# Patient Record
Sex: MALE | Race: WHITE
[De-identification: names, ages, dates, MRNs, and addresses within clinical notes are randomized per-mention and may not be internally consistent; named-entity substitution may affect disease eponyms.]

---

## 2017-01-27 ENCOUNTER — HOSPITAL ENCOUNTER (EMERGENCY)
Dept: HOSPITAL 62 - ER | Age: 22
Discharge: HOME | End: 2017-01-27
Payer: SELF-PAY

## 2017-01-27 VITALS — SYSTOLIC BLOOD PRESSURE: 107 MMHG | DIASTOLIC BLOOD PRESSURE: 76 MMHG

## 2017-01-27 DIAGNOSIS — Z87.442: ICD-10-CM

## 2017-01-27 DIAGNOSIS — R10.9: ICD-10-CM

## 2017-01-27 DIAGNOSIS — F17.200: ICD-10-CM
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
## 2017-01-27 DIAGNOSIS — S39.011A: Primary | ICD-10-CM

## 2017-01-27 DIAGNOSIS — R11.0: ICD-10-CM

## 2017-01-27 LAB
ALBUMIN SERPL-MCNC: 4.3 G/DL (ref 3.5–5)
ALP SERPL-CCNC: 68 U/L (ref 38–126)
ALT SERPL-CCNC: 223 U/L (ref 21–72)
ANION GAP SERPL CALC-SCNC: 12 MMOL/L (ref 5–19)
APPEARANCE UR: (no result)
AST SERPL-CCNC: 317 U/L (ref 17–59)
BASOPHILS # BLD AUTO: 0 10^3/UL (ref 0–0.2)
BASOPHILS NFR BLD AUTO: 0.4 % (ref 0–2)
BILIRUB DIRECT SERPL-MCNC: 0 MG/DL (ref 0–0.3)
BILIRUB SERPL-MCNC: 0.5 MG/DL (ref 0.2–1.3)
BILIRUB UR QL STRIP: NEGATIVE
BUN SERPL-MCNC: 11 MG/DL (ref 7–20)
CALCIUM: 9.5 MG/DL (ref 8.4–10.2)
CHLORIDE SERPL-SCNC: 100 MMOL/L (ref 98–107)
CO2 SERPL-SCNC: 33 MMOL/L (ref 22–30)
CREAT SERPL-MCNC: 1.13 MG/DL (ref 0.52–1.25)
EOSINOPHIL # BLD AUTO: 0.3 10^3/UL (ref 0–0.6)
EOSINOPHIL NFR BLD AUTO: 4.4 % (ref 0–6)
ERYTHROCYTE [DISTWIDTH] IN BLOOD BY AUTOMATED COUNT: 13.5 % (ref 11.5–14)
GLUCOSE SERPL-MCNC: 112 MG/DL (ref 75–110)
GLUCOSE UR STRIP-MCNC: NEGATIVE MG/DL
HCT VFR BLD CALC: 46.8 % (ref 37.9–51)
HGB BLD-MCNC: 15.5 G/DL (ref 13.5–17)
HGB HCT DIFFERENCE: -0.3
KETONES UR STRIP-MCNC: NEGATIVE MG/DL
LIPASE SERPL-CCNC: 171.9 U/L (ref 23–300)
LYMPHOCYTES # BLD AUTO: 1.6 10^3/UL (ref 0.5–4.7)
LYMPHOCYTES NFR BLD AUTO: 21.9 % (ref 13–45)
MCH RBC QN AUTO: 31.2 PG (ref 27–33.4)
MCHC RBC AUTO-ENTMCNC: 33.1 G/DL (ref 32–36)
MCV RBC AUTO: 94 FL (ref 80–97)
MONOCYTES # BLD AUTO: 0.2 10^3/UL (ref 0.1–1.4)
MONOCYTES NFR BLD AUTO: 3 % (ref 3–13)
NEUTROPHILS # BLD AUTO: 5.3 10^3/UL (ref 1.7–8.2)
NEUTS SEG NFR BLD AUTO: 70.3 % (ref 42–78)
NITRITE UR QL STRIP: NEGATIVE
PH UR STRIP: 8 [PH] (ref 5–9)
POTASSIUM SERPL-SCNC: 4.4 MMOL/L (ref 3.6–5)
PROT SERPL-MCNC: 7 G/DL (ref 6.3–8.2)
PROT UR STRIP-MCNC: NEGATIVE MG/DL
RBC # BLD AUTO: 4.96 10^6/UL (ref 4.35–5.55)
SODIUM SERPL-SCNC: 144.9 MMOL/L (ref 137–145)
SP GR UR STRIP: 1.02
UROBILINOGEN UR-MCNC: NEGATIVE MG/DL (ref ?–2)
WBC # BLD AUTO: 7.5 10^3/UL (ref 4–10.5)

## 2017-01-27 PROCEDURE — 36415 COLL VENOUS BLD VENIPUNCTURE: CPT

## 2017-01-27 PROCEDURE — 74177 CT ABD & PELVIS W/CONTRAST: CPT

## 2017-01-27 PROCEDURE — 99284 EMERGENCY DEPT VISIT MOD MDM: CPT

## 2017-01-27 PROCEDURE — 96374 THER/PROPH/DIAG INJ IV PUSH: CPT

## 2017-01-27 PROCEDURE — 81001 URINALYSIS AUTO W/SCOPE: CPT

## 2017-01-27 PROCEDURE — 85025 COMPLETE CBC W/AUTO DIFF WBC: CPT

## 2017-01-27 PROCEDURE — 83690 ASSAY OF LIPASE: CPT

## 2017-01-27 PROCEDURE — 96375 TX/PRO/DX INJ NEW DRUG ADDON: CPT

## 2017-01-27 PROCEDURE — 80053 COMPREHEN METABOLIC PANEL: CPT

## 2017-01-27 NOTE — ER DOCUMENT REPORT
ED Medical Screen (RME)





- General


Stated Complaint: ABDOMINAL SWELLING


Time seen by provider: 14:55


Mode of Arrival: Ambulatory


Information source: Patient


Notes: 


21-year-old male resents to ED for a painful bulge in his right lower abdomen.  

States he has nausea no vomiting.  Denies any fevers.  Consult to Dr. Pang 

for the assessment he recommended labs and ultrasound.




















I have greeted and performed a rapid initial assessment of this patient.  A 

comprehensive ED assessment and evaluation of the patient, analysis of test 

results and completion of medical decision making process will be conducted by 

an additional ED providers.


TRAVEL OUTSIDE OF THE [place] IN LAST 30 DAYS: No





- Related Data


Allergies/Adverse Reactions: 


 





No Known Allergies Allergy (Verified 01/27/17 14:52)


 











Past Medical History





- Past Medical History


Cardiac Medical History: 


   Denies: Hx Atrial Fibrillation, Hx Congestive Heart Failure, Hx Coronary 

Artery Disease


Pulmonary Medical History: 


   Denies: Hx Asthma, Hx Bronchitis, Hx COPD, Hx Pneumonia


Neurological Medical History: Denies: Hx Cerebrovascular Accident, Hx Migraine, 

Hx Seizures


Endocrine Medical History: Denies: Hx Diabetes Mellitus Type 1, Hx Diabetes 

Mellitus Type 2, Hx Graves' Disease, Hx Hyperthyroidism


Renal/ Medical History: Reports: Hx Kidney Stones


Malignancy Medical History: Denies Hx Bone Cancer, Denies Hx Brain Cancer, 

Denies Hx Colorectal Cancer, Denies Hx Leukemia


GI Medical History: Denies: Hx Cirrhosis, Hx Crohn's Disease, Hx Diverticulitis

, Hx Gastritis


Musculoskeltal Medical History: Denies Hx Arthritis, Denies Hx Fibromyalgia, 

Denies Hx Gout, Denies Hx Multiple Sclerosis, Reports Hx Musculoskeletal Trauma


Skin Medical History: Denies Hx Cellulitis, Denies Hx Eczema, Denies Hx MRSA


Psychiatric Medical History: 


   Denies: Hx Anxiety, Hx Attention Deficit Hyperactivity Disorder, Hx Bipolar 

Disorder


Traumatic Medical History: Reports: Hx Fractures - Multiple, Hx Pneumothorax


Past Surgical History: Reports: Hx Orthopedic Surgery - facial reconstruction





- Immunizations


Immunizations up to date: Yes


Hx Diphtheria, Pertussis, Tetanus Vaccination: Yes

## 2017-01-27 NOTE — ER DOCUMENT REPORT
ED GI/





- General


Time seen by provider: 16:20


Mode of Arrival: Ambulatory


Information source: Patient


TRAVEL OUTSIDE OF THE [place] IN LAST 30 DAYS: No





- HPI


Patient complains to provider of: Abdominal pain


Onset: This morning


Timing/Duration: Gradual, Persistent


Associated symptoms: Nausea





<FAISAL CAMILO - Last Filed: 01/27/17 17:28>





<REZAAVTAR LOERA MAMTA - Last Filed: 01/27/17 21:51>





- General


Chief Complaint: Abdominal Pain


Stated Complaint: ABDOMINAL SWELLING


Notes: 


Patient is a 21 year old male presenting to the emergency department with 

complaints of abdominal pain.  Patient states that his pain started this 

morning and that while sitting at his computer work he felt some sharp pains to 

his right side.  Patient states that his pain is exacerbated with sitting up or 

bending over.  Patient states that his pain is the fourth and the surface of 

his abdomen.  Patient also complains of some nausea.  Patient's mother states 

that she found out about the abdominal pain around 14:15 this afternoon.  

Patient's abdominal pain is on the right side. Patient describes his right side 

of his abdomen to move like "jelly" when rubbing your hand across it and states 

that the left side does not move "like jelly." Patient does not have any pain 

on the left side of his abdomen. Patient has no previous abdominal surgeries. 

Patient has no known allergies. (FAISAL CAMILO)





- Related Data


Allergies/Adverse Reactions: 


 





No Known Allergies Allergy (Verified 01/27/17 14:52)


 











Past Medical History





- General


Information source: Patient





- Social History


Smoking Status: Current Every Day Smoker


Chew tobacco use (# tins/day): No


Frequency of alcohol use: Occasional


Drug Abuse: None


Family History: None


Patient has suicidal ideation: No


Patient has homicidal ideation: No


Renal/ Medical History: Reports: Hx Kidney Stones


Musculoskeltal Medical History: Reports Hx Musculoskeletal Trauma


Traumatic Medical History: Reports: Hx Fractures - Multiple, Hx Pneumothorax


Past Surgical History: Reports: Hx Orthopedic Surgery - facial reconstruction





- Immunizations


Immunizations up to date: Yes


Hx Diphtheria, Pertussis, Tetanus Vaccination: Yes





<FAISAL CAMILO - Last Filed: 01/27/17 17:28>





Review of Systems





- Review of Systems


Constitutional: No symptoms reported


EENT: No symptoms reported


Cardiovascular: No symptoms reported


Respiratory: No symptoms reported


Gastrointestinal: See HPI, Abdominal pain, Nausea


Genitourinary: No symptoms reported


Male Genitourinary: No symptoms reported


Musculoskeletal: No symptoms reported


Skin: No symptoms reported


Hematologic/Lymphatic: No symptoms reported


Neurological/Psychological: No symptoms reported


-: Yes All other systems reviewed and negative





<FAISAL CAMILO - Last Filed: 01/27/17 17:28>





Physical Exam





- Vital signs


Interpretation: Normal





- General


General appearance: Appears well, Alert


In distress: Mild





- HEENT


Head: Normocephalic, Atraumatic


Eyes: Normal


Pupils: PERRL


Mucous membranes: Normal





- Respiratory


Respiratory status: No respiratory distress


Chest status: Nontender


Breath sounds: Normal


Chest palpation: Normal





- Cardiovascular


Rhythm: Regular


Heart sounds: Normal auscultation


Murmur: No





- Abdominal


Inspection: Normal


Distension: No distension


Bowel sounds: Normal


Tenderness: Tender - Right abdomen is tender to palpation


Organomegaly: No organomegaly





- Back


Back: Normal, Nontender





- Extremities


General upper extremity: Normal inspection, Normal ROM, Normal strength


General lower extremity: Normal inspection, Normal ROM, Normal strength





- Neurological


Neuro grossly intact: Yes


Cognition: Normal


Orientation: AAOx4


[place] Coma Scale Eye Opening: Spontaneous


[place] Coma Scale Verbal: Oriented


[place] Coma Scale Motor: Obeys Commands


[place] Coma Scale Total: 15


Speech: Normal





- Psychological


Associated symptoms: Normal affect, Normal mood





- Skin


Skin Temperature: Warm


Skin Moisture: Dry





<FAISAL CAMILO - Last Filed: 01/27/17 17:28>





Course





- Laboratory


Result Diagrams: 


 01/27/17 15:00





 01/27/17 15:00





<LESLEEFAISAL - Last Filed: 01/27/17 17:28>





- Laboratory


Result Diagrams: 


 01/27/17 15:00





 01/27/17 15:00





<AVTAR TOUSSAINT - Last Filed: 01/27/17 21:51>





- Re-evaluation


Re-evalutation: 





01/27/17 


Patient with tenderness to palpation over his right lower abdomen.  No evidence 

for hernia.  No genital pain.  CT with no acute findings.  Will likely rectus 

muscle injury.  Patient is eating in the room and feels better.  He would like 

to go home.  Stable for discharge.  Return if any worsening or concerning 

symptoms. (AVTAR TOUSSAINT)





- Vital Signs


Vital signs: 


 











Temp Pulse Resp BP Pulse Ox


 


 98.3 F   76   16   107/76   98 


 


 01/27/17 19:10  01/27/17 19:10  01/27/17 19:10  01/27/17 19:10  01/27/17 19:10








 (FAISAL CAMILO)


 (AVTAR TOUSSAINT)





- Laboratory


Laboratory results interpreted by me: 


 











  01/27/17





  15:00


 


Carbon Dioxide  33 H


 


Glucose  112 H


 


AST  317 H


 


ALT  223 H








 (FAISAL CAMILO)


 (AVTAR TOUSSAINT)





Discharge





<FAISAL CAMILO - Last Filed: 01/27/17 17:28>





<AVTAR TOUSSAINT - Last Filed: 01/27/17 21:51>





- Discharge


Clinical Impression: 


Strain of rectus abdominis muscle


Qualifiers:


 Encounter type: initial encounter Qualified Code(s): S39.011A - Strain of 

muscle, fascia and tendon of abdomen, initial encounter





Condition: Stable


Disposition: HOME, SELF-CARE


Instructions:  Muscle Strain ([facility])


Forms:  Return to Work


Scribe Attestation: 





01/27/17 21:51


I personally performed the services described in the documentation, reviewed 

and edited the documentation which was dictated to the scribe in my presence, 

and it accurately records my words and actions. (AVTAR TOUSSAINT)





Scribe Documentation





- Scribe


Written by Mic:Holli Camilo 1/27/17 17:30


acting as scribe for :: Sydney





<FAISAL CAMILO - Last Filed: 01/27/17 17:28>

## 2017-03-23 ENCOUNTER — HOSPITAL ENCOUNTER (EMERGENCY)
Dept: HOSPITAL 62 - ER | Age: 22
Discharge: HOME | End: 2017-03-23
Payer: SELF-PAY

## 2017-03-23 VITALS — DIASTOLIC BLOOD PRESSURE: 77 MMHG | SYSTOLIC BLOOD PRESSURE: 121 MMHG

## 2017-03-23 DIAGNOSIS — R41.0: ICD-10-CM

## 2017-03-23 DIAGNOSIS — M25.512: ICD-10-CM
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
## 2017-03-23 DIAGNOSIS — R51: ICD-10-CM

## 2017-03-23 DIAGNOSIS — F41.9: ICD-10-CM

## 2017-03-23 DIAGNOSIS — S02.2XXA: Primary | ICD-10-CM

## 2017-03-23 LAB
ANION GAP SERPL CALC-SCNC: 20 MMOL/L (ref 5–19)
BUN SERPL-MCNC: 12 MG/DL (ref 7–20)
CALCIUM: 9.6 MG/DL (ref 8.4–10.2)
CHLORIDE SERPL-SCNC: 103 MMOL/L (ref 98–107)
CO2 SERPL-SCNC: 24 MMOL/L (ref 22–30)
CREAT SERPL-MCNC: 1.21 MG/DL (ref 0.52–1.25)
ETHANOL SERPL-MCNC: < 10 MG/DL
GLUCOSE SERPL-MCNC: 105 MG/DL (ref 75–110)
POTASSIUM SERPL-SCNC: 4 MMOL/L (ref 3.6–5)
SODIUM SERPL-SCNC: 146.6 MMOL/L (ref 137–145)

## 2017-03-23 PROCEDURE — 80048 BASIC METABOLIC PNL TOTAL CA: CPT

## 2017-03-23 PROCEDURE — 73030 X-RAY EXAM OF SHOULDER: CPT

## 2017-03-23 PROCEDURE — 70450 CT HEAD/BRAIN W/O DYE: CPT

## 2017-03-23 PROCEDURE — 72125 CT NECK SPINE W/O DYE: CPT

## 2017-03-23 PROCEDURE — 70486 CT MAXILLOFACIAL W/O DYE: CPT

## 2017-03-23 PROCEDURE — 80307 DRUG TEST PRSMV CHEM ANLYZR: CPT

## 2017-03-23 PROCEDURE — L0120 CERV FLEX N/ADJ FOAM PRE OTS: HCPCS

## 2017-03-23 PROCEDURE — 99285 EMERGENCY DEPT VISIT HI MDM: CPT

## 2017-03-23 PROCEDURE — 36415 COLL VENOUS BLD VENIPUNCTURE: CPT
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
## 2017-06-19 ENCOUNTER — HOSPITAL ENCOUNTER (EMERGENCY)
Dept: HOSPITAL 62 - ER | Age: 22
Discharge: HOME | End: 2017-06-19
Payer: SELF-PAY

## 2017-06-19 VITALS — DIASTOLIC BLOOD PRESSURE: 68 MMHG | SYSTOLIC BLOOD PRESSURE: 106 MMHG

## 2017-06-19 DIAGNOSIS — R68.84: Primary | ICD-10-CM

## 2017-06-19 DIAGNOSIS — F17.210: ICD-10-CM

## 2017-06-19 DIAGNOSIS — M25.512: ICD-10-CM

## 2017-06-19 PROCEDURE — 99283 EMERGENCY DEPT VISIT LOW MDM: CPT
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
## 2017-06-19 NOTE — RADIOLOGY REPORT ([facility])
EXAM DESCRIPTION:  SHOULDER LEFT 2 OR MORE VIEWS



COMPLETED DATE/TIME:  6/19/2017 12:18 pm



REASON FOR STUDY:  left shoulder pain, hx of dislocation



COMPARISON:  3/23/2017.



NUMBER OF VIEWS:  Three views.



TECHNIQUE:  Internal rotation, external rotation, and Y view images acquired of the left shoulder.



LIMITATIONS:  None.



FINDINGS:  MINERALIZATION: Normal.

BONES: No acute fracture or dislocation.  No worrisome bone lesions.

JOINTS: No dislocation.

VISUALIZED LUNGS AND RIBS: No pneumothorax.  No rib fracture.

SOFT TISSUES: No radiopaque foreign body.

OTHER: No other significant finding.



IMPRESSION:  NEGATIVE STUDY OF THE LEFT SHOULDER. NO RADIOGRAPHIC EVIDENCE OF ACUTE INJURY.



TECHNICAL DOCUMENTATION:  JOB ID:  [id number]

 2011 [employer]- All Rights Reserved

## 2018-01-06 ENCOUNTER — HOSPITAL ENCOUNTER (EMERGENCY)
Dept: HOSPITAL 62 - ER | Age: 23
Discharge: HOME | End: 2018-01-06
Payer: SELF-PAY

## 2018-01-06 VITALS — DIASTOLIC BLOOD PRESSURE: 79 MMHG | SYSTOLIC BLOOD PRESSURE: 116 MMHG

## 2018-01-06 DIAGNOSIS — K62.89: Primary | ICD-10-CM

## 2018-01-06 DIAGNOSIS — K59.00: ICD-10-CM

## 2018-01-06 DIAGNOSIS — F17.200: ICD-10-CM

## 2018-01-06 PROCEDURE — 99283 EMERGENCY DEPT VISIT LOW MDM: CPT

## 2018-01-06 NOTE — ER DOCUMENT REPORT
ED Medical Screen (RME)





- General


Chief Complaint: Rectal Pain


Stated Complaint: RECTAL PAIN


Time Seen by Provider: 01/06/18 18:42


Notes: 





22-year-old male patient four-day history of rectal pain.  States he had an 

external hemorrhoid that he could feel about 14 days ago, it seems to have gone 

away but now a little bit up inside it is quite tender.  He has had an external 

hemorrhoid incised and drained in the past.





I have greeted and performed a rapid initial assessment of this patient.  A 

comprehensive ED assessment and evaluation of the patient, analysis of test 

results and completion of the medical decision making process will be conducted 

by additional ED providers.


TRAVEL OUTSIDE OF THE [place] IN LAST 30 DAYS: No





- Related Data


Allergies/Adverse Reactions: 


 





No Known Allergies Allergy (Verified 01/06/18 18:04)


 











Past Medical History





- Social History


Chew tobacco use (# tins/day): No


Frequency of alcohol use: Occasional


Drug Abuse: None





- Past Medical History


Cardiac Medical History: 


   Denies: Hx Atrial Fibrillation, Hx Congestive Heart Failure, Hx Coronary 

Artery Disease


Pulmonary Medical History: 


   Denies: Hx Asthma, Hx Bronchitis, Hx COPD, Hx Pneumonia


Neurological Medical History: Denies: Hx Cerebrovascular Accident, Hx Migraine, 

Hx Seizures


Endocrine Medical History: Denies: Hx Diabetes Mellitus Type 1, Hx Diabetes 

Mellitus Type 2, Hx Graves' Disease, Hx Hyperthyroidism


Renal/ Medical History: Reports: Hx Kidney Stones.  Denies: Hx Peritoneal 

Dialysis


Malignancy Medical History: Denies Hx Bone Cancer, Denies Hx Brain Cancer, 

Denies Hx Colorectal Cancer, Denies Hx Leukemia


GI Medical History: Denies: Hx Cirrhosis, Hx Crohn's Disease, Hx Diverticulitis

, Hx Gastritis


Musculoskeltal Medical History: Denies Hx Arthritis, Denies Hx Fibromyalgia, 

Denies Hx Gout, Denies Hx Multiple Sclerosis, Reports Hx Musculoskeletal Trauma


Skin Medical History: Denies Hx Cellulitis, Denies Hx Eczema, Denies Hx MRSA


Psychiatric Medical History: 


   Denies: Hx Anxiety, Hx Attention Deficit Hyperactivity Disorder, Hx Bipolar 

Disorder


Traumatic Medical History: Reports: Hx Fractures - Multiple, Hx Pneumothorax


Past Surgical History: Reports: Hx Orthopedic Surgery - facial reconstruction





- Immunizations


Immunizations up to date: Yes


Hx Diphtheria, Pertussis, Tetanus Vaccination: Yes





Physical Exam





- Vital signs


Vitals: 





 











Temp Pulse Resp BP Pulse Ox


 


 98.2 F   86   14   109/73   97 


 


 01/06/18 18:11  01/06/18 18:11  01/06/18 18:11  01/06/18 18:11  01/06/18 18:11














Course





- Vital Signs


Vital signs: 





 











Temp Pulse Resp BP Pulse Ox


 


 98.2 F   86   14   109/73   97 


 


 01/06/18 18:11  01/06/18 18:11  01/06/18 18:11  01/06/18 18:11  01/06/18 18:11

## 2018-01-06 NOTE — ER DOCUMENT REPORT
ED GI/





- General


Chief Complaint: Rectal Pain


Stated Complaint: RECTAL PAIN


Time Seen by Provider: 01/06/18 18:42


Notes: 


Patient is a 22-year-old male that comes emergency department for chief 

complaint of rectal pain.  He states it is been bothering him intermittently 

for the past month but more so over the past 4 days.  He reports pain with 

bowel movement.  He admits that he is frequently constipated, has not had a 

bowel movement in a couple of days, sometimes has gone weeks without having a 

bowel movement.  He has had hemorrhoid incision about 6 months ago.  He denies 

fever or chills, abdominal pain, nausea or vomiting.  He denies any daily 

medications.





TRAVEL OUTSIDE OF THE [place] IN LAST 30 DAYS: No





- Related Data


Allergies/Adverse Reactions: 


 





No Known Allergies Allergy (Verified 01/06/18 18:04)


 











Past Medical History





- General


Information source: Patient





- Social History


Smoking Status: Current Every Day Smoker


Chew tobacco use (# tins/day): No


Smoking Education Provided: Yes - <3 min


Frequency of alcohol use: Occasional


Drug Abuse: None


Lives with: Family


Family History: None


Patient has suicidal ideation: No


Patient has homicidal ideation: No





- Past Medical History


Cardiac Medical History: 


   Denies: Hx Atrial Fibrillation, Hx Congestive Heart Failure, Hx Coronary 

Artery Disease


Pulmonary Medical History: 


   Denies: Hx Asthma, Hx Bronchitis, Hx COPD, Hx Pneumonia


Neurological Medical History: Denies: Hx Cerebrovascular Accident, Hx Migraine, 

Hx Seizures


Endocrine Medical History: Denies: Hx Diabetes Mellitus Type 1, Hx Diabetes 

Mellitus Type 2, Hx Graves' Disease, Hx Hyperthyroidism


Renal/ Medical History: Reports: Hx Kidney Stones.  Denies: Hx Peritoneal 

Dialysis


Malignancy Medical History: Denies Hx Bone Cancer, Denies Hx Brain Cancer, 

Denies Hx Colorectal Cancer, Denies Hx Leukemia


GI Medical History: Denies: Hx Cirrhosis, Hx Crohn's Disease, Hx Diverticulitis

, Hx Gastritis


Musculoskeltal Medical History: Denies Hx Arthritis, Denies Hx Fibromyalgia, 

Denies Hx Gout, Denies Hx Multiple Sclerosis, Reports Hx Musculoskeletal Trauma


Skin Medical History: Denies Hx Cellulitis, Denies Hx Eczema, Denies Hx MRSA


Psychiatric Medical History: 


   Denies: Hx Anxiety, Hx Attention Deficit Hyperactivity Disorder, Hx Bipolar 

Disorder


Traumatic Medical History: Reports: Hx Fractures - Multiple, Hx Pneumothorax


Past Surgical History: Reports: Hx Orthopedic Surgery - facial reconstruction





- Immunizations


Immunizations up to date: Yes


Hx Diphtheria, Pertussis, Tetanus Vaccination: Yes





Review of Systems





- Review of Systems


Constitutional: No symptoms reported


EENT: No symptoms reported


Cardiovascular: No symptoms reported


Respiratory: No symptoms reported


Gastrointestinal: See HPI


Genitourinary: No symptoms reported


Male Genitourinary: No symptoms reported


Musculoskeletal: No symptoms reported


Skin: No symptoms reported


Hematologic/Lymphatic: No symptoms reported


Neurological/Psychological: No symptoms reported





Physical Exam





- Vital signs


Vitals: 


 











Temp Pulse Resp BP Pulse Ox


 


 98.2 F   86   14   109/73   97 


 


 01/06/18 18:11  01/06/18 18:11  01/06/18 18:11  01/06/18 18:11  01/06/18 18:11











Interpretation: Normal





- General


General appearance: Appears well, Alert





- HEENT


Head: Normocephalic, Atraumatic


Eyes: Normal


Pupils: PERRL





- Respiratory


Respiratory status: No respiratory distress


Chest status: Nontender


Breath sounds: Normal


Chest palpation: Normal





- Cardiovascular


Rhythm: Regular


Heart sounds: Normal auscultation


Murmur: No





- Abdominal


Inspection: Normal


Distension: No distension


Bowel sounds: Normal


Tenderness: Nontender.  No: Tender, Guarding


Organomegaly: No organomegaly





- Rectal


Tenderness: Yes


Stool: No: Black, Bloody


Hemorrhoids: External - External hemorrhoid located at approximately the 8 o'

clock position, no bleeding, not obviously thrombosed, no surrounding induration

, erythema, or other abnormality noted





- Back


Back: Normal, Nontender.  No: Tender





- Extremities


General upper extremity: Normal inspection, Nontender, Normal ROM, Normal 

strength


General lower extremity: Normal inspection, Nontender, Normal ROM, Normal 

strength





- Neurological


Neuro grossly intact: Yes


Cognition: Normal


Orientation: AAOx4


Luning Coma Scale Eye Opening: Spontaneous


Armando Coma Scale Verbal: Oriented


Armando Coma Scale Motor: Obeys Commands


Armando Coma Scale Total: 15


Speech: Normal


Motor strength normal: LUE, RUE, LLE, RLE


Sensory: Normal





- Psychological


Associated symptoms: Normal affect, Normal mood





- Skin


Skin Temperature: Warm


Skin Moisture: Dry


Skin Color: Normal





Course





- Re-evaluation


Re-evalutation: 


Soft abdomen, well-appearing patient, external hemorrhoid present on 

examination without thrombosis, significant swelling, bleeding.  Rectal 

examination otherwise unremarkable.  No erythema or induration suggesting 

abscess.





- Vital Signs


Vital signs: 


 











Temp Pulse Resp BP Pulse Ox


 


 98.4 F   77   18   116/79   96 


 


 01/06/18 19:37  01/06/18 19:37  01/06/18 19:37  01/06/18 19:37  01/06/18 19:37














Discharge





- Discharge


Clinical Impression: 


 Rectal pain





Condition: Stable


Disposition: HOME, SELF-CARE


Additional Instructions: 


No infection is seen at this time, examination indicates multiple hemorrhoids, 

recommendation is to take the stool softener Colace as prescribed, use the 

topical creams as prescribed, only take the pain medicine at night to help you 

sleep (taking too much of this will make your symptoms worse).  Increase fiber 

in your diet and drink plenty of water.  Follow-up with primary care.  Return 

for any concerning or worsening symptoms including severe pain, heavy bleeding, 

fever, or any other concerning symptoms.


Prescriptions: 


Docusate Sodium [Colace 100 mg Capsule] 100 mg PO ASDIR PRN #30 capsule


 PRN Reason: 


Hydrocortisone/Pramoxine [Analpram Hc 1% Cream] 30 gm RC TID PRN #1 cream.appl


 PRN Reason:

## 2018-06-04 ENCOUNTER — HOSPITAL ENCOUNTER (EMERGENCY)
Dept: HOSPITAL 62 - ER | Age: 23
Discharge: HOME | End: 2018-06-04
Payer: SELF-PAY

## 2018-06-04 VITALS — DIASTOLIC BLOOD PRESSURE: 72 MMHG | SYSTOLIC BLOOD PRESSURE: 119 MMHG

## 2018-06-04 DIAGNOSIS — K62.89: ICD-10-CM

## 2018-06-04 DIAGNOSIS — K64.4: Primary | ICD-10-CM

## 2018-06-04 PROCEDURE — 99282 EMERGENCY DEPT VISIT SF MDM: CPT

## 2018-06-04 NOTE — ER DOCUMENT REPORT
ED General





- General


Chief Complaint: Rectal Pain


Stated Complaint: RECTAL PAIN


Time Seen by Provider: 06/04/18 13:53


TRAVEL OUTSIDE OF THE [place] IN LAST 30 DAYS: No





- HPI


Notes: 





Normally healthy 22-year-old man presents with 10/10 rectal pain burning in 

nature without radiation has been getting worse and worse for 1 week.  Patient 

is a lengthy history of external hemorrhoids in the past has been seen up at 

[facility] for them.  Denies fever chills or any trauma to his rectum denies 

receptive intercourse with men





- Related Data


Allergies/Adverse Reactions: 


 





No Known Allergies Allergy (Verified 06/04/18 13:02)


 











Past Medical History





- Social History


Smoking Status: Never Smoker


Chew tobacco use (# tins/day): No


Frequency of alcohol use: None


Drug Abuse: None


Family History: None


Patient has suicidal ideation: No


Patient has homicidal ideation: No





- Past Medical History


Cardiac Medical History: 


   Denies: Hx Atrial Fibrillation, Hx Congestive Heart Failure, Hx Coronary 

Artery Disease


Pulmonary Medical History: 


   Denies: Hx Asthma, Hx Bronchitis, Hx COPD, Hx Pneumonia


Neurological Medical History: Denies: Hx Cerebrovascular Accident, Hx Migraine, 

Hx Seizures


Endocrine Medical History: Denies: Hx Diabetes Mellitus Type 1, Hx Diabetes 

Mellitus Type 2, Hx Graves' Disease, Hx Hyperthyroidism


Renal/ Medical History: Reports: Hx Kidney Stones.  Denies: Hx Peritoneal 

Dialysis


Malignancy Medical History: Denies Hx Bone Cancer, Denies Hx Brain Cancer, 

Denies Hx Colorectal Cancer, Denies Hx Leukemia


GI Medical History: Denies: Hx Cirrhosis, Hx Crohn's Disease, Hx Diverticulitis

, Hx Gastritis


Musculoskeltal Medical History: Denies Hx Arthritis, Denies Hx Fibromyalgia, 

Denies Hx Gout, Denies Hx Multiple Sclerosis, Reports Hx Musculoskeletal Trauma


Skin Medical History: Denies Hx Cellulitis, Denies Hx Eczema, Denies Hx MRSA


Psychiatric Medical History: 


   Denies: Hx Anxiety, Hx Attention Deficit Hyperactivity Disorder, Hx Bipolar 

Disorder


Traumatic Medical History: Reports: Hx Fractures - Multiple, Hx Pneumothorax


Past Surgical History: Reports: Hx Orthopedic Surgery - facial reconstruction





- Immunizations


Immunizations up to date: Yes


Hx Diphtheria, Pertussis, Tetanus Vaccination: Yes





Review of Systems





- Review of Systems


Notes: 





REVIEW OF SYSTEMS:


CONSTITUTIONAL: -fevers, -chills


EENT: -eye pain, -difficulty swallowing, -nasal congestion


CARDIOVASCULAR: -chest pain, -syncope.


RESPIRATORY: -cough, -SOB


GASTROINTESTINAL: -abdominal pain, -nausea, -vomiting, -diarrhea


GENITOURINARY: -dysuria, -hematuria


MUSCULOSKELETAL: -back pain, -neck pain


SKIN: -rash or skin lesions.


HEMATOLOGIC: -easy bruising or bleeding.


LYMPHATIC: -swollen, enlarged glands.


NEUROLOGICAL: -altered mental status or loss of consciousness, -headache, -

neurologic symptoms


PSYCHIATRIC: -anxiety, -depression.


ALL OTHER SYSTEMS REVIEWED AND NEGATIVE.





Physical Exam





- Vital signs


Vitals: 





 











Temp Pulse Resp BP Pulse Ox


 


 98.7 F   64   14   119/72   99 


 


 06/04/18 13:15  06/04/18 13:15  06/04/18 13:15  06/04/18 13:15  06/04/18 13:15














- Notes


Notes: 





PHYSICAL EXAMINATION:


GENERAL: Well-appearing, well-nourished and in no acute distress.


HEAD: Atraumatic, normocephalic.


EYES: Pupils equal round and reactive to light, extraocular movements intact, 

sclera anicteric, conjunctiva are normal.


ENT: nares patent, oropharynx clear without exudates.  Moist mucous membranes.


NECK: Normal range of motion, supple without lymphadenopathy


LUNGS: Breath sounds clear to auscultation bilaterally and equal.  No wheezes 

rales or rhonchi.


HEART: Regular rate and rhythm without murmurs


ABDOMEN: Soft, nontender, normoactive bowel sounds.  No guarding, no rebound.  

No masses appreciated.


EXTREMITIES: Normal range of motion, no pitting or edema.  No cyanosis.


NEUROLOGICAL: Cranial nerves grossly intact.  Normal speech, normal gait.  

Normal sensory and motor exams.


PSYCH: Normal mood, normal affect.


SKIN: Warm, Dry, normal turgor, no rashes or lesions noted.


: External normal hemorrhoid noted on rectum, nonthrombosed skin appears 

normal very tender to palpation.





Course





- Re-evaluation


Re-evalutation: 





06/04/18 14:09


Young man presents with external hemorrhoid.  Nonthrombosed.  Stable vital 

signs afebrile reassuring exam.  Will be given prescription for oral analgesia, 

stool softener, hydrocortisone cream.  Given referral to local community clinic.





- Vital Signs


Vital signs: 





 











Temp Pulse Resp BP Pulse Ox


 


 98.7 F   64   14   119/72   99 


 


 06/04/18 13:15  06/04/18 13:15  06/04/18 13:15  06/04/18 13:15  06/04/18 13:15














Discharge





- Discharge


Clinical Impression: 


 External hemorrhoids





Condition: Stable


Disposition: HOME, SELF-CARE


Instructions:  Hemorrhoids (OM)


Referrals: 


[facility] [NO LOCAL MD] - Follow up as needed

## 2018-06-26 ENCOUNTER — HOSPITAL ENCOUNTER (EMERGENCY)
Dept: HOSPITAL 62 - ER | Age: 23
Discharge: HOME | End: 2018-06-26
Payer: SELF-PAY

## 2018-06-26 VITALS — DIASTOLIC BLOOD PRESSURE: 63 MMHG | SYSTOLIC BLOOD PRESSURE: 115 MMHG

## 2018-06-26 DIAGNOSIS — Z87.442: ICD-10-CM

## 2018-06-26 DIAGNOSIS — F17.210: ICD-10-CM
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
## 2018-06-26 DIAGNOSIS — S69.91XA: Primary | ICD-10-CM

## 2018-06-26 PROCEDURE — 99283 EMERGENCY DEPT VISIT LOW MDM: CPT

## 2018-06-26 PROCEDURE — 99406 BEHAV CHNG SMOKING 3-10 MIN: CPT

## 2018-06-26 NOTE — ER DOCUMENT REPORT
ED Hand/Wrist Injury





- General


Chief Complaint: Finger Injury


Stated Complaint: FINGER INJURY


Time Seen by Provider: 06/26/18 13:50


Mode of Arrival: Ambulatory


Information source: Patient


Notes: 





22-year-old male presents to ED for complaint of right index finger pain.  He 

states he smashed his finger between 2 pieces of metal 3 days ago.  He states 

he has increased pain over the last couple days.  He states his pain with any 

palpation to the finger.  There is no deformity noted at the end of the finger 

but it is a little swollen.  Is alert and oriented respirations regular and 

unlabored speaking in full sentences walking with a even steady gait.


TRAVEL OUTSIDE OF THE [place] IN LAST 30 DAYS: No





- HPI


Injury to: Index finger


Onset: Other - 3 days


Where: Home, Outdoors


Timing: Still present, Worse


Quality of pain: Throbbing


Severity: Moderate


Pain Level: 4


Context: Blow, Swelling





- Related Data


Allergies/Adverse Reactions: 


 





No Known Allergies Allergy (Verified 06/26/18 13:02)


 











Past Medical History





- General


Information source: Patient





- Social History


Smoking Status: Current Every Day Smoker


Cigarette use (# per day): Yes - 5-6 cigarettes a day


Chew tobacco use (# tins/day): No


Smoking Education Provided: Yes - 4 minutes


Frequency of alcohol use: Social


Drug Abuse: None


Occupation: None


Lives with: Family


Family History: None


Patient has suicidal ideation: No


Patient has homicidal ideation: No





- Past Medical History


Cardiac Medical History: Reports: None


Pulmonary Medical History: Reports: None


EENT Medical History: Reports: None


Neurological Medical History: Reports: None


Endocrine Medical History: Reports: None


Renal/ Medical History: Reports: Hx Kidney Stones


Malignancy Medical History: Reports None


GI Medical History: Reports: None


Musculoskeltal Medical History: Reports Hx Musculoskeletal Trauma


Skin Medical History: Reports None


Psychiatric Medical History: Reports: None


Traumatic Medical History: Reports: Hx Fractures - Multiple, Hx Pneumothorax


Infectious Medical History: Reports: None


Past Surgical History: Reports: Hx Orthopedic Surgery - facial reconstruction, 

Other - Chest tube





- Immunizations


Immunizations up to date: Yes


Hx Diphtheria, Pertussis, Tetanus Vaccination: Yes





Review of Systems





- Review of Systems


Musculoskeletal: Other - Right index finger pain to the end of the finger 

swollen mild erythema


Skin: Other - Erythema and swelling to the end of the right index finger





Physical Exam





- Vital signs


Vitals: 


 











Temp Pulse Resp BP Pulse Ox


 


 99.1 F   74   20   118/71   100 


 


 06/26/18 13:10  06/26/18 13:10  06/26/18 13:10  06/26/18 13:10  06/26/18 13:10














- Extremities


Hand: Tender, No evidence of human bite, No evidence of FB, Swelling - Index 

finger, Other.  No: Abrasion, Deformity, Dislocation, Instability, Laceration, 

Nail injury





- Skin


Skin Temperature: Warm


Skin Moisture: Dry


Skin Color: Normal, Ecchymosis


Location of irregularity: Extremities - Index finger


Character of irregularity: Erythematous


Irregularity with: Swelling, Tenderness





Course





- Re-evaluation


Re-evalutation: 





06/26/18 21:29


Discussed with patient.  Written report of x-ray given to patient to follow-up 

with his primary doctor.  Patient was treated with Keflex for the mild erythema 

to the end of the finger for cellulitis.





- Vital Signs


Vital signs: 


 











Temp Pulse Resp BP Pulse Ox


 


 98.3 F   64   14   115/63   98 


 


 06/26/18 15:07  06/26/18 15:07  06/26/18 15:07  06/26/18 15:07  06/26/18 15:07














- Diagnostic Test


Radiology reviewed: Image reviewed, Reports reviewed





Discharge





- Discharge


Clinical Impression: 


Finger injury


Qualifiers:


 Encounter type: initial encounter Laterality: right Qualified Code(s): 

S69.91XA - Unspecified injury of right wrist, hand and finger(s), initial 

encounter





Condition: Stable


Disposition: HOME, SELF-CARE


Instructions:  Family Physicians / Practices, Use of Over-The-Counter Ibuprofen 

([facility])


Additional Instructions: 


CONTUSION:


    Your injury has resulted in a contusion -- a crushing of the deep tissues.  

No injury to important structures was detected during the physician's exam.  

Contusions vary in the amount of pain they cause, and in the length of time 

required for healing.  Typically, the area will become bruised, and will remain 

painful to touch for two or three weeks.  However, most patients are back to 

working and playing within a few days.


     After the initial period of rest and cold-packs, your symptoms (together 

with the doctor's recommendations) will determine how rapidly you can get back 

to full activity.  Usually this means "do what feels okay, but don't do things 

that hurt."


     If re-examination was recommended, it's important to follow up as 

instructed.  Call the doctor or return any time if pain increases, if swelling 

becomes severe, if you develop numbness or weakness in an injured extremity, or 

if any other alarming symptoms occur.





CELLULITIS:





     You have an infection of your skin and underlying soft tissues called 

cellulitis.  This is due to bacteria, which can enter through any break in the 

skin, or even through an irritated hair follicle.  Untreated, cellulitis will 

usually worsen.


     Antibiotics are required.  Usually, warm packs or warm soaks, and 

elevation of the infected area are recommended.  You should start getting 

better within 24 to 36 hours.


     Most infections respond quickly to the right medication. Follow-up care is 

important, however, to check for abscess (boil) formation, unsuspected foreign 

body, or resistant infection.


     If you develop fever, chills, or if the area of infection is becoming 

rapidly more swollen or painful, call the doctor at once.





Cephalexin





     The antibiotic you've been prescribed is a member of the cephalosporin 

class.  This type of antibiotic covers a wide variety of infections, including 

those of the skin, lungs, and urinary tract. It's useful for staph infections.


     This antibiotic is slightly similar to the penicillin family. In rare cases

, a person who is allergic to penicillin will also be allergic to this 

medication.  If you have had a severe allergic reaction to penicillin, and have 

not taken this antibiotic since that time, notify your doctor.


     Antibiotics which cover many germs ("broad spectrum" antibiotics) are more 

likely to cause diarrhea or "yeast" infections.  Women prone to vaginal yeast 

problems may suffer an attack after taking this antibiotic.  In infants, oral 

thrush (white spots "stuck" on the cheek) or yeast diaper rash may result.  See 

your doctor if these problems occur.  Call at once if you develop itching, hives

, shortness of breath, or lightheadedness.





Epsom Salt Soaks





     Soak the wound area in a container of warm epsom salt water.  If you can't 

get the wound area into a bucket or pan, use a folded towel soaked in the epsom 

salt solution and apply to the area.


     Use clean hot tap water (about the temperature of a very warm bath), 

mixing in about one (1) teaspoon for every pint of water.


           Two gallon --> 16 teaspoons Epsom Salts


           One gallon -->  8 teaspoons Epsom Salts


           Two quarts -->  4 teaspoons Epsom Salts


           One quart  -->  2 teaspoons Epsom Salts


     Soak the wound for about 20 minutes while gently moving it around in the 

water.  Repeat this four (4) times a day.








USE OF TYLENOL (ACETAMINOPHEN):


     Acetaminophen may be taken for pain relief or fever control. It's much 

safer than aspirin, offering a wider range of "safe" dosages.  It is safe 

during pregnancy.  Some brand names are Tylenol, Panadol, Datril, Anacin 3, 

Tempra, and Liquiprin. Acetaminophen can be repeated every four hours.  The 

following are maximum recommended dosages:





WEIGHT         Dose             Drops                  Elixir        Chewable(

80mg)


(LBS.)                            drprs=droppers    tsp=teaspoon


6               40 mg            0.4 ml (1/2)


6-11            80 mg            0.8 ml (full)              tsp               

   1       tab


12-16         120 mg           1 1/2 drprs             3/4  tsp               1 

1/2  tabs


17-23         160 mg             2  drprs             1    tsp                 

  2       tabs


24-30         240 mg             3  drprs             1 1/2 tsp                

3       tabs


30-35         320 mg                                       2    tsp            

       4       tabs


36-41         360 mg                                       2 1/4   tsp         

     4 1/2 tabs


42-47         400 mg                                       2 1/2   tsp         

     5      tabs


48-53         480 mg                                       3    tsp            

       6      tabs


54-59         520 mg                                       3  1/4  tsp         

     6 1/2 tabs


60-64         560 mg                                       3  1/2  tsp         

     7      tabs 


65-70         600 mg                                       3  3/4  tsp         

     7 1/2 tabs


71-76         640 mg                                       4   tsp             

      8      tabs


77-82         720 mg                                       4 1/2   tsp         

    9      tabs


83-88         800 mg                                       5   tsp             

    10      tabs





>89 pounds or adults          650 mg to 900 mg





Acetaminophen can be repeated every four hours.  Maximum dose not to exceed 

4000 mg a day.





   These maximum recommended dosages are slightly higher than the dosages 

written on the product container, but these dosages are very safe and below the 

toxic dosage for acetaminophen.








ORAL NARCOTIC MEDICATION:


     You have been given a norco for pain control.  This medication is a 

narcotic.  It's best taken with food, as nausea can result if taken on an empty 

stomach.


     Don't operate machinery or drive within six hours of taking this 

medication.  Do not combine this medicine with alcohol, or with any medication 

which can cause sedation (such as cold tablets or sleeping pills) unless you 

get permission from the physician.


     Narcotics tend to cause constipation.  If possible, drink plenty of fluids 

and eat a diet high in fiber and fruits.








FOLLOW-UP CARE:


If you have been referred to a physician for follow-up care, call the physician

s office for an appointment as you were instructed or within the next two days.

  If you experience worsening or a significant change in your symptoms, notify 

the physician immediately or return to the Emergency Department at any time for 

re-evaluation.


Prescriptions: 


Cephalexin Monohydrate [Keflex 500 mg Capsule] 500 mg PO QID #20 capsule


Forms:  Smoking Cessation Education


Referrals: 


ALLAN ALVAREZ MD [ACTIVE STAFF] - Follow up as needed

## 2018-06-26 NOTE — RADIOLOGY REPORT (SQ)
EXAM DESCRIPTION:  FINGER RIGHT



COMPLETED DATE/TIME:  6/26/2018 2:06 pm



REASON FOR STUDY:  right index finger injury



COMPARISON:  None.



NUMBER OF VIEWS:  Three views.



TECHNIQUE:  AP, lateral, and oblique images acquired of the right second finger.



LIMITATIONS:  None.



FINDINGS:  MINERALIZATION: Normal.

BONES: No acute fracture or dislocation.  There is some deformity of the distal end of the middle pha
lanx of the 2nd digit which could be congenital in nature or related to previous trauma.  There are s
ome minimal subcortical cystic changes at the level of DIP joint of the 2nd digit.

SOFT TISSUES: No soft tissue swelling.  No foreign body.

OTHER: No other significant finding.



IMPRESSION:  No definite evidence for acute fracture or dislocation.  Other findings as noted above



COMMENT:  SITE OF TRAUMA/COMPLAINT MARKED/STAMP COMPLETED: Yes



TECHNICAL DOCUMENTATION:  JOB ID:  [id number]

 2011 [employer]- All Rights Reserved



Reading location - IP/workstation name: [email or web address]

## 2018-08-15 ENCOUNTER — HOSPITAL ENCOUNTER (EMERGENCY)
Dept: HOSPITAL 62 - ER | Age: 23
Discharge: HOME | End: 2018-08-15
Payer: SELF-PAY

## 2018-08-15 VITALS — DIASTOLIC BLOOD PRESSURE: 70 MMHG | SYSTOLIC BLOOD PRESSURE: 103 MMHG

## 2018-08-15 DIAGNOSIS — R07.89: ICD-10-CM

## 2018-08-15 DIAGNOSIS — M79.631: Primary | ICD-10-CM
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
## 2018-08-15 DIAGNOSIS — R07.81: ICD-10-CM

## 2018-08-15 DIAGNOSIS — M25.532: ICD-10-CM

## 2018-08-15 PROCEDURE — 99283 EMERGENCY DEPT VISIT LOW MDM: CPT

## 2018-08-15 NOTE — ER DOCUMENT REPORT
ED General





- General


Chief Complaint: Fall Injury


Stated Complaint: FELL OFF ROOF


Time Seen by Provider: 08/15/18 20:16


TRAVEL OUTSIDE OF THE [place] IN LAST 30 DAYS: No





- HPI


Notes: 





22-year-old male states he fell off a roof around 4 PM.  He was trying to fix a 

leak and slipped on a tarp. He tried to catch himself on the particle board 

roof of the porch on the trailer with his right arm and right chest wall. He 

continued to fall and landed on his back on the grassy ground. He did not hit 

his head. No LOC. No SOB. no vomiting or abdominal pain.





- Related Data


Allergies/Adverse Reactions: 


 





No Known Allergies Allergy (Verified 06/26/18 13:02)


 











Past Medical History





- Social History


Smoking Status: Current Every Day Smoker


Chew tobacco use (# tins/day): No


Frequency of alcohol use: Occasional


Drug Abuse: None


Family History: None


Patient has suicidal ideation: No


Patient has homicidal ideation: No





- Past Medical History


Cardiac Medical History: 


   Denies: Hx Atrial Fibrillation, Hx Congestive Heart Failure, Hx Coronary 

Artery Disease


Pulmonary Medical History: 


   Denies: Hx Asthma, Hx Bronchitis, Hx COPD, Hx Pneumonia


Neurological Medical History: Denies: Hx Cerebrovascular Accident, Hx Migraine, 

Hx Seizures


Endocrine Medical History: Denies: Hx Diabetes Mellitus Type 1, Hx Diabetes 

Mellitus Type 2, Hx Graves' Disease, Hx Hyperthyroidism


Renal/ Medical History: Reports: Hx Kidney Stones.  Denies: Hx Peritoneal 

Dialysis


Malignancy Medical History: Denies Hx Bone Cancer, Denies Hx Brain Cancer, 

Denies Hx Colorectal Cancer, Denies Hx Leukemia


GI Medical History: Denies: Hx Cirrhosis, Hx Crohn's Disease, Hx Diverticulitis

, Hx Gastritis


Musculoskeletal Medical History: Denies Hx Arthritis, Denies Hx Fibromyalgia, 

Denies Hx Gout, Denies Hx Multiple Sclerosis, Reports Hx Musculoskeletal Trauma


Skin Medical History: Denies Hx Cellulitis, Denies Hx Eczema, Denies Hx MRSA


Psychiatric Medical History: 


   Denies: Hx Anxiety, Hx Attention Deficit Hyperactivity Disorder, Hx Bipolar 

Disorder


Traumatic Medical History: Reports: Hx Fractures - Multiple, Hx Pneumothorax


Past Surgical History: Reports: Hx Orthopedic Surgery - facial reconstruction, 

Other - Chest tube





- Immunizations


Immunizations up to date: Yes


Hx Diphtheria, Pertussis, Tetanus Vaccination: Yes





Review of Systems





- Review of Systems


Notes: 





Constitutional: Negative for fever.


HENT: Negative for sore throat.


Eyes: Negative for visual changes.


Cardiovascular: Right chest wall pain.


Respiratory: Negative for shortness of breath.


Gastrointestinal: Negative for abdominal pain, vomiting or diarrhea.


Genitourinary: Negative for dysuria.


Musculoskeletal: Positive for right forearm pain, left wrist pain, right rib 

pain.  No back or neck pain.


Skin: Negative for rash.


Neurological: Negative for headaches, weakness or numbness.





10 point ROS negative except as marked above and in HPI.





Physical Exam





- Vital signs


Vitals: 


 











Temp Pulse BP Pulse Ox


 


 98.3 F   86   92/68 L  99 


 


 08/15/18 19:59  08/15/18 19:59  08/15/18 19:59  08/15/18 19:59














- Notes


Notes: 





PHYSICAL EXAMINATION:


GENERAL: Well-appearing, well-nourished and in no acute distress.


HEAD: Atraumatic, normocephalic.


EYES: Pupils equal round and reactive to light, extraocular movements intact, 

conjunctiva are normal.


ENT: nares patent, oropharynx clear without exudates.  Moist mucous membranes.


NECK: Normal range of motion, supple without lymphadenopathy.  No midline 

tenderness or step-offs


LUNGS: Breath sounds clear to auscultation bilaterally and equal.  No wheezes 

rales or rhonchi.


HEART: Regular rate and rhythm, abrasions right chest wall with mild 

tenderness.  No crepitus or deformity.


ABDOMEN: Soft, nontender, normoactive bowel sounds.  No guarding, no rebound.  

No masses appreciated.


EXTREMITIES: Normal range of motion, no pitting or edema.  No cyanosis.  

Abrasion right forearm with tenderness to dorsal aspect.  Abrasion left volar 

wrist over median nerve with tenderness.  Some limited range of motion due to 

pain.  Mild pain with axial loading of the thumb.  No midline back tenderness 

or step-offs.


NEUROLOGICAL: Cranial nerves grossly intact.  Normal speech, normal gait.  

Normal sensory and motor exams.


PSYCH: Normal mood, normal affect.


SKIN: Warm, Dry, normal turgor, no rashes or lesions noted.





Course





- Re-evaluation


Re-evalutation: 





08/15/18 20:54


X-rays ordered.  No head injury or neck pain.


08/15/18 20:55


Tetanus up-to-date


08/15/18 21:30


No fractures.  Pain treated.








 





Forearm X-Ray  08/15/18 20:47


IMPRESSION:  NEGATIVE STUDY OF THE RIGHT FOREARM. NO RADIOGRAPHIC EVIDENCE OF 

ACUTE INJURY.


 








Ribs w/Chest X-Ray  08/15/18 20:47


IMPRESSION:  NO PNEUMOTHORAX.  NO DISPLACED RIB FRACTURES.


 








Wrist X-Ray  08/15/18 20:47


IMPRESSION:  NEGATIVE STUDY OF THE LEFT WRIST. NO RADIOGRAPHIC EVIDENCE OF 

ACUTE INJURY.


 














At this time will discharge with return precautions and follow-up 

recommendations.  Verbal discharge instructions given a the bedside and 

opportunity for questions given. Medication warnings reviewed. Patient is in 

agreement with this plan and has verbalized understanding of return precautions 

and the need for primary care follow-up in the next 24-72 hours.











- Vital Signs


Vital signs: 


 











Temp Pulse Resp BP Pulse Ox


 


 98.3 F   86      92/68 L  99 


 


 08/15/18 19:59  08/15/18 19:59     08/15/18 19:59  08/15/18 19:59














Discharge





- Discharge


Clinical Impression: 


 Abrasions of multiple sites





Arm pain


Qualifiers:


 Laterality: unspecified laterality Qualified Code(s): M79.603 - Pain in arm, 

unspecified





Condition: Good


Disposition: HOME, SELF-CARE


Instructions:  Abrasions ([facility])


Additional Instructions: 


Alternate ice and heat to sore areas.  Monitor abrasions for signs of 

infection.  Return for any worsening or concerning symptoms.  Take Motrin and 

Tylenol as needed for pain.


Referrals: 


SWETA ROBB MD [COMMUNITY BASED STAFF] - Follow up in 3-5 days

## 2018-08-15 NOTE — RADIOLOGY REPORT (SQ)
EXAM DESCRIPTION:  RIBS RIGHT W/PA CHEST



COMPLETED DATE/TIME:  8/15/2018 9:04 pm



REASON FOR STUDY:  fall off roof, right forearm, left wrist, and rib



COMPARISON:  None.



TECHNIQUE:  Frontal view of the chest and additional views of the right ribs acquired.



NUMBER OF VIEWS:  Three view.



LIMITATIONS:  None.



FINDINGS:  FRONTAL CXR: No pneumothorax.  No pleural effusion.  No atelectasis or infiltrates.

RIBS: No displaced rib fractures.  No lytic or blastic bony lesions.

OTHER: No other significant finding.



IMPRESSION:  NO PNEUMOTHORAX.  NO DISPLACED RIB FRACTURES.



COMMENT:  SITE OF TRAUMA/COMPLAINT MARKED/STAMP COMPLETED: YES.



TECHNICAL DOCUMENTATION:  JOB ID:  [id number]

 2011 [employer]- All Rights Reserved



Reading location - IP/workstation name: [email or web address]

## 2018-08-15 NOTE — RADIOLOGY REPORT (SQ)
EXAM DESCRIPTION:  WRIST LEFT 3 VIEWS



COMPLETED DATE/TIME:  8/15/2018 9:04 pm



REASON FOR STUDY:  fall off roof, right forearm, left wrist, and rib



COMPARISON:  None.



NUMBER OF VIEWS:  Three views.



TECHNIQUE:  AP, lateral, and oblique radiographic images acquired of the left wrist.



LIMITATIONS:  None.



FINDINGS:  MINERALIZATION: Normal.

BONES: No acute fracture or dislocation.  No worrisome bone lesions.  Normal alignment.

SOFT TISSUES: No soft tissue swelling.  No foreign body.

OTHER: No other significant finding.



IMPRESSION:  NEGATIVE STUDY OF THE LEFT WRIST. NO RADIOGRAPHIC EVIDENCE OF ACUTE INJURY.



TECHNICAL DOCUMENTATION:  JOB ID:  [id number]

 2011 [employer]- All Rights Reserved



Reading location - IP/workstation name: [email or web address]

## 2018-08-15 NOTE — RADIOLOGY REPORT (SQ)
EXAM DESCRIPTION:  FOREARM RIGHT



COMPLETED DATE/TIME:  8/15/2018 9:04 pm



REASON FOR STUDY:  fall off roof, right forearm, left wrist, and rib



COMPARISON:  None.



NUMBER OF VIEWS:  Two views.



TECHNIQUE:  Two radiographic images acquired of the right forearm, including elbow and wrist in at le
ast one projection.



LIMITATIONS:  None.



FINDINGS:  MINERALIZATION: Normal.

BONES: No acute fracture.  No worrisome bone lesions.

SOFT TISSUES: No obvious swelling or foreign body.

OTHER: No other significant finding.



IMPRESSION:  NEGATIVE STUDY OF THE RIGHT FOREARM. NO RADIOGRAPHIC EVIDENCE OF ACUTE INJURY.



TECHNICAL DOCUMENTATION:  JOB ID:  [id number]

 2011 [email or web address]- All Rights Reserved



Reading location - IP/workstation name: [email or web address]

## 2019-07-03 ENCOUNTER — HOSPITAL ENCOUNTER (EMERGENCY)
Dept: HOSPITAL 62 - ER | Age: 24
Discharge: HOME | End: 2019-07-03
Payer: MEDICAID

## 2019-07-03 VITALS — DIASTOLIC BLOOD PRESSURE: 83 MMHG | SYSTOLIC BLOOD PRESSURE: 123 MMHG

## 2019-07-03 DIAGNOSIS — M79.671: ICD-10-CM

## 2019-07-03 DIAGNOSIS — S93.601A: Primary | ICD-10-CM
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
## 2019-07-03 DIAGNOSIS — M79.89: ICD-10-CM

## 2019-07-03 PROCEDURE — 99283 EMERGENCY DEPT VISIT LOW MDM: CPT

## 2019-07-03 NOTE — RADIOLOGY REPORT (SQ)
EXAM DESCRIPTION: 



XR RIGHT FOOT 3 OR MORE VIEWS



COMPLETED DATE/TME:  07/03/2019 20:34



CLINICAL HISTORY: 



23 years, Male, pain



COMPARISON:

None.



NUMBER OF VIEWS:





TECHNIQUE:





LIMITATIONS:

None.



FINDINGS:



There is dorsal soft tissue swelling.



No fracture or dislocation.



No evidence of arthritis.



Mineralization of bone appears normal.



IMPRESSION:



Dorsal soft tissue swelling.

 



copyright 2011 [employer]- All Rights Reserved

## 2019-07-03 NOTE — ER DOCUMENT REPORT
HPI





- HPI


Time Seen by Provider: 07/03/19 22:28


Pain Level: 4


Context: 





Patient is a 23-year-old male who presents to the emergency department with a 

chief complaint of right foot pain.  Patient states earlier tonight he was 

running outside when he stepped into a hole and fell forward.  Patient states he

has had significant swelling to the top of the right foot.  Patient states he 

has been able to ambulate but with severe pain.  Patient states he is ice to the

foot.  Patient denies obvious deformity.  Patient denies numbness or tingling to

his toes.





- REPRODUCTIVE


Reproductive: DENIES: Pregnant:





Past Medical History





- General


Information source: Patient





- Social History


Smoking Status: Unknown if Ever Smoked


Family History: None





- Past Medical History


Cardiac Medical History: Reports: None


   Denies: Hx Atrial Fibrillation, Hx Congestive Heart Failure, Hx Coronary 

Artery Disease


Pulmonary Medical History: Reports: None


   Denies: Hx Asthma, Hx Bronchitis, Hx COPD, Hx Pneumonia


EENT Medical History: Reports: None


Neurological Medical History: Reports: None.  Denies: Hx Cerebrovascular 

Accident, Hx Migraine, Hx Seizures


Endocrine Medical History: Reports: None.  Denies: Hx Diabetes Mellitus Type 1, 

Hx Diabetes Mellitus Type 2, Hx Graves' Disease, Hx Hyperthyroidism


Renal/ Medical History: Reports: Hx Kidney Stones.  Denies: Hx Peritoneal 

Dialysis


Malignancy Medical History: Reports None, Denies Hx Bone Cancer, Denies Hx Brain

Cancer, Denies Hx Colorectal Cancer, Denies Hx Leukemia


GI Medical History: Reports: None.  Denies: Hx Cirrhosis, Hx Crohn's Disease, Hx

Diverticulitis, Hx Gastritis


Musculoskeletal Medical History: Denies Hx Arthritis, Denies Hx Fibromyalgia, 

Denies Hx Gout, Denies Hx Multiple Sclerosis, Reports Hx Musculoskeletal Trauma


Skin Medical History: Reports None, Denies Hx Cellulitis, Denies Hx Eczema, 

Denies Hx MRSA


Psychiatric Medical History: Reports: None


   Denies: Hx Anxiety, Hx Attention Deficit Hyperactivity Disorder, Hx Bipolar 

Disorder


Traumatic Medical History: Reports: Hx Fractures - Multiple, Hx Pneumothorax


Infectious Medical History: Reports: None


Past Surgical History: Reports: Hx Orthopedic Surgery - facial reconstruction, 

Other - Chest tube





- Immunizations


Immunizations up to date: Yes


Hx Diphtheria, Pertussis, Tetanus Vaccination: Yes





Vertical Provider Document





- CONSTITUTIONAL


Agree With Documented VS: Yes


Exam Limitations: No Limitations


General Appearance: No Apparent Distress





- INFECTION CONTROL


TRAVEL OUTSIDE OF THE [place] IN LAST 30 DAYS: No





- HEENT


HEENT: Atraumatic, Normocephalic





- NECK


Neck: Normal Inspection





- RESPIRATORY


Respiratory: Breath Sounds Normal, No Respiratory Distress





- CARDIOVASCULAR


Cardiovascular: Regular Rate





- GI/ABDOMEN


Gastrointestinal: Abdomen Soft, Abdomen Non-Tender





- MUSCULOSKELETAL/EXTREMETIES


Notes: 





Edema and ecchymosis noted to the dorsal aspect of the right foot, no erythema, 

no laceration.  No obvious deformity.  Patient has a strong +2 dorsalis pedis 

and posterior tibial pulse.  Patient has good flexion and extension of the foot.

 Patient is able to move his ankle and full range of motion.





- NEURO


Level of Consciousness: Awake, Alert, Appropriate





- DERM


Integumentary: Warm, Dry





Course





- Vital Signs


Vital signs: 


                                        











Temp Pulse Resp BP Pulse Ox


 


 98.6 F   66   20   117/72   99 


 


 07/03/19 21:26  07/03/19 21:26  07/03/19 21:26  07/03/19 21:26  07/03/19 21:26














Procedures





- Immobilization


  ** Right Foot


Pre-Proc Neuro Vasc Exam: Normal


Immobilizer type: Ace wrap, Crutches


Performed by: RN


Post-Proc Neuro Vasc Exam: Normal, Unchanged from pre-exam


Alignment checked and good: Yes





Discharge





- Discharge


Clinical Impression: 


Foot sprain


Qualifiers:


 Encounter type: initial encounter Laterality: right Qualified Code(s): S93.601A

- Unspecified sprain of right foot, initial encounter





Condition: Stable


Disposition: HOME, SELF-CARE


Instructions:  Ice Packs ([facility]), Oral Narcotic Medication ([facility]), Sprain ([facility])


Additional Instructions: 


Today he was seen in the emergency department for right foot injury.  We did 

obtain an x-ray which was negative for any dislocation or acute fracture.  It 

does appear that you did sprain your foot.  Please use cold packs and elevate 

the foot.  Please use the Ace bandage and crutches for comfort.  A sprain can 

take weeks to heal so please rest as much as possible.  Into the emergency 

department for severe pain, numbness, loss of function in the injured area.  

Take Tylenol and ibuprofen as needed for pain.  Try to stay off of the foot as 

much as possible.








Sprain





     Your injury is a sprain.  A sprain results from stretching or tearing of 

the ligaments, usually from a twisting injury.  The ligaments will require time 

and protection in order to heal properly. Many sprains are quite disabling and 

should be taken seriously.


     The usual initial treatment of sprains is cold packs, elevation, and rest 

of the injured area.  Your physician has assessed the seriousness of your 

ligament injury, and has outlined a treatment plan. Understand that this 

treatment may change, depending on how you progress.


     If a re-examination was recommended, it is important that you follow up as 

instructed.  Call the doctor any time if there is severe pain, numbness, or loss

of function in the injured area.

## 2019-09-23 ENCOUNTER — HOSPITAL ENCOUNTER (EMERGENCY)
Dept: HOSPITAL 62 - ER | Age: 24
LOS: 1 days | Discharge: HOME | End: 2019-09-24
Payer: MEDICAID

## 2019-09-23 VITALS — DIASTOLIC BLOOD PRESSURE: 65 MMHG | SYSTOLIC BLOOD PRESSURE: 116 MMHG

## 2019-09-23 DIAGNOSIS — R45.1: ICD-10-CM

## 2019-09-23 DIAGNOSIS — F10.920: Primary | ICD-10-CM

## 2019-09-23 DIAGNOSIS — F12.10: ICD-10-CM

## 2019-09-23 PROCEDURE — 80307 DRUG TEST PRSMV CHEM ANLYZR: CPT

## 2019-09-23 PROCEDURE — 99284 EMERGENCY DEPT VISIT MOD MDM: CPT

## 2019-09-23 PROCEDURE — 36415 COLL VENOUS BLD VENIPUNCTURE: CPT

## 2019-09-23 PROCEDURE — 81001 URINALYSIS AUTO W/SCOPE: CPT

## 2019-09-23 NOTE — ER DOCUMENT REPORT
ED Substance Abuse / Acc. OD





- General


Chief Complaint: ETOH Abuse


Stated Complaint: ETOH


Time Seen by Provider: 09/23/19 22:55


Mode of Arrival: Stretcher


Information source: Patient, Emergency Med Personnel


Cannot obtain history due to: Intoxicated


Notes: 





HISTORY OF PRESENT ILLNESS:





Patient is a 23-year-old male with no significant past medical history who 

presents with alcohol intoxication and marijuana abuse.  Patient is unable to 

give complete history and details given his intoxication.  EMS reports that the 

patient became heavily intoxicated after drinking "about 12 beers," police also 

arrived and stated the patient was being very agitated.





Onset: Prior to arrival


Provocation: Alcohol


Quality: Intoxication, agitation


Radiation: None


Severity: Moderate


Timing: Constant


SI/HI: None


Hallucinations: None


Current therapist: None


Current treatment: None











REVIEW OF SYSTEMS:





CONSTITUTIONAL : Denies fever or chills, no sweats.  Denies recent illness.


EENT:   Denies eye, ear, throat, or mouth pain or symptoms.  Denies nasal or 

sinus congestion.


CARDIOVASCULAR: Denies chest pain.


RESPIRATORY: Denies cough, cold, or chest congestion.  Denies shortness of 

breath, difficulty breathing, or wheezing.


GASTROINTESTINAL: Denies abdominal pain.  Denies nausea, vomiting, or diarrhea. 

Denies constipation. 


GENITOURINARY:  Denies difficulty urinating, painful urination, burning, 

frequency, or blood in urine.


FEMALE  GENITOURINARY:  Denies vaginal bleeding, abnormal or irregular periods. 

Last menstrual period


MUSCULOSKELETAL:  Denies neck or back pain or joint pain or swelling.


SKIN:   Denies rash or skin lesions.


HEMATOLOGIC :   Denies easy bruising or bleeding.


LYMPHATIC:  Denies swollen, enlarged glands.


NEUROLOGICAL:  Denies altered mental status or loss of consciousness.  Denies 

headache.  Denies weakness or paralysis or loss of use of either side.  Denies 

problems with gait or speech.  Denies sensory or motor loss.


PSYCHIATRIC: Positive for alcohol abuse.  Denies suicidal/homicidal thoughts.  

Denies anxiety or stress or depression.





All other systems reviewed and negative.











PHYSICAL EXAMINATION:





GENERAL: Intoxicated-appearing, disheveled, well-nourished and in no acute 

distress.


HEAD: Atraumatic, normocephalic. No scalp deformity, depression, or crepitance.


EYES: Pupils are 3 mm and equal/round/reactive to light, extraocular movements 

intact, sclera anicteric, conjunctiva are normal.


ENT: Nares patent bilaterally, oropharynx clear without exudates or palatal 

petechia.  Moist mucous membranes. No tonsil hypertrophy.


NECK: Normal range of motion, supple without lymphadenopathy.


LUNGS: Breath sounds present, equal, and clear to auscultation bilaterally.  No 

wheezes, rales, or rhonchi.


HEART: Regular rate and rhythm without murmurs, rubs, or gallops. 2+ peripheral 

pulses.  Normal capillary refill.


ABDOMEN: Soft, nontender, nondistended. Normoactive bowel sounds.  No guarding, 

no rebound.  No masses appreciated.


BACK: Normal contour, no midline tenderness. Rectal exam deferred.


GENITAL/PELVC: Deferred.


EXTREMITIES: Normal range of motion, no pitting or edema.  No cyanosis.


NEUROLOGICAL: No focal neurological deficits. Moves all extremities 

spontaneously and on command.


PSYCH: Normal mood, normal affect.  No suicidal thoughts/ideations.  No 

homicidal thoughts/ideations.  No hallucinations.


SKIN: Warm, dry, normal turgor, no rashes or lesions noted.











ASSESSMENT AND PLAN:





This patient is a 23-year-old male who presents with acute alcohol intoxication.





1. Will obtain urine, drug screen, and ethanol panel.


2. Will observe overnight and reassess for disposition in the morning.


TRAVEL OUTSIDE OF THE [place] IN LAST 30 DAYS: No





- HPI


Patient complains to provider of: Alcohol abuse


Onset: Just prior to arrival


Onset/Duration: Gradual


Quality of pain: No pain


Severity: Moderate


Pain Level: Denies


Overdose of: Alcohol


Associated Symptoms: Nausea/vomiting


Similar symptoms previously: No


Recently seen / treated by doctor: No





- Related Data


Allergies/Adverse Reactions: 


                                        





No Known Allergies Allergy (Verified 06/26/18 13:02)


   











Past Medical History





- General


Information source: Patient, Emergency Med Personnel


Cannot obtain history due to: Intoxicated





- Social History


Smoking Status: Unknown if Ever Smoked


Frequency of alcohol use: Heavy


Drug Abuse: Marijuana


Lives with: Alone


Family History: None


Patient has suicidal ideation: No


Patient has homicidal ideation: No





- Past Medical History


Cardiac Medical History: Reports: None


   Denies: Hx Atrial Fibrillation, Hx Congestive Heart Failure, Hx Coronary 

Artery Disease


Pulmonary Medical History: Reports: None


   Denies: Hx Asthma, Hx Bronchitis, Hx COPD, Hx Pneumonia


EENT Medical History: Reports: None


Neurological Medical History: Reports: None.  Denies: Hx Cerebrovascular 

Accident, Hx Migraine, Hx Seizures


Endocrine Medical History: Reports: None.  Denies: Hx Diabetes Mellitus Type 1, 

Hx Diabetes Mellitus Type 2, Hx Graves' Disease, Hx Hyperthyroidism


Renal/ Medical History: Reports: Hx Kidney Stones.  Denies: Hx Peritoneal 

Dialysis


Malignancy Medical History: Reports None, Denies Hx Bone Cancer, Denies Hx Brain

Cancer, Denies Hx Colorectal Cancer, Denies Hx Leukemia


GI Medical History: Reports: None.  Denies: Hx Cirrhosis, Hx Crohn's Disease, Hx

Diverticulitis, Hx Gastritis


Musculoskeletal Medical History: Denies Hx Arthritis, Denies Hx Fibromyalgia, 

Denies Hx Gout, Denies Hx Multiple Sclerosis, Reports Hx Musculoskeletal Trauma


Skin Medical History: Reports None, Denies Hx Cellulitis, Denies Hx Eczema, 

Denies Hx MRSA


Psychiatric Medical History: Reports: None


   Denies: Hx Anxiety, Hx Attention Deficit Hyperactivity Disorder, Hx Bipolar 

Disorder


Traumatic Medical History: Reports: Hx Fractures - Multiple, Hx Pneumothorax


Infectious Medical History: Reports: None


Past Surgical History: Reports: Hx Orthopedic Surgery - facial reconstruction, 

Other - Chest tube





- Immunizations


Immunizations up to date: Yes


Hx Diphtheria, Pertussis, Tetanus Vaccination: Yes





Review of Systems





- Review of Systems


Constitutional: No symptoms reported


EENT: No symptoms reported


Cardiovascular: No symptoms reported


Respiratory: No symptoms reported


Gastrointestinal: No symptoms reported


Genitourinary: No symptoms reported


Male Genitourinary: No symptoms reported


Musculoskeletal: No symptoms reported


Skin: No symptoms reported


Hematologic/Lymphatic: No symptoms reported


Neurological/Psychological: See HPI, Other - Alcohol intoxication


-: Yes All other systems reviewed and negative





Physical Exam





- Vital signs


Vitals: 


                                        











Resp BP


 


 16   116/65 


 


 09/23/19 22:44  09/23/19 22:44











Interpretation: Normal





Course





- Re-evaluation


Re-evalutation: 





09/24/19 04:34


Will discharge the patient home with strict return precautions and follow-up 

with primary care. All results were explained to and discussed with the patient,

and all questions addressed and answered. The patient voices both understanding 

and agreeing with the plan.





- Vital Signs


Vital signs: 


                                        











Temp Pulse Resp BP Pulse Ox


 


       16   116/65    


 


       09/23/19 22:44  09/23/19 22:44   














- Laboratory


Laboratory results interpreted by me: 


                                        











  09/24/19





  00:00


 


Serum Alcohol  333 H*














Discharge





- Discharge


Clinical Impression: 


Alcohol intoxication


Qualifiers:


 Complication of substance-induced condition: uncomplicated Qualified Code(s): 

F10.920 - Alcohol use, unspecified with intoxication, uncomplicated





Condition: Good


Disposition: HOME, SELF-CARE


Instructions:  Acute Alcohol Intoxication ([facility])


Additional Instructions: 


You have been evaluated in the Emergency Department for alcohol intoxication.  

While here, you had blood work that was normal and it is now safe to be 

discharged home.  Please follow-up with your primary physician as instructed in 

one week to be rechecked. Return to the Emergency Department if you experience 

confusion, disorientation, difficulty walking, or any other concerning symptoms.


Print Language: English

## 2019-09-24 LAB
APPEARANCE UR: CLEAR
APTT PPP: COLORLESS S
BARBITURATES UR QL SCN: NEGATIVE
BILIRUB UR QL STRIP: NEGATIVE
GLUCOSE UR STRIP-MCNC: NEGATIVE MG/DL
KETONES UR STRIP-MCNC: NEGATIVE MG/DL
METHADONE UR QL SCN: NEGATIVE
NITRITE UR QL STRIP: NEGATIVE
PCP UR QL SCN: NEGATIVE
PH UR STRIP: 6 [PH] (ref 5–9)
PROT UR STRIP-MCNC: NEGATIVE MG/DL
SP GR UR STRIP: 1
URINE AMPHETAMINES SCREEN: NEGATIVE
URINE BENZODIAZEPINES SCREEN: NEGATIVE
URINE COCAINE SCREEN: NEGATIVE
URINE MARIJUANA (THC) SCREEN: (no result)
UROBILINOGEN UR-MCNC: NEGATIVE MG/DL (ref ?–2)

## 2019-09-30 ENCOUNTER — HOSPITAL ENCOUNTER (EMERGENCY)
Dept: HOSPITAL 62 - ER | Age: 24
Discharge: HOME | End: 2019-09-30
Payer: MEDICAID

## 2019-09-30 VITALS — SYSTOLIC BLOOD PRESSURE: 101 MMHG | DIASTOLIC BLOOD PRESSURE: 67 MMHG

## 2019-09-30 DIAGNOSIS — Z87.442: ICD-10-CM

## 2019-09-30 DIAGNOSIS — K64.5: Primary | ICD-10-CM

## 2019-09-30 DIAGNOSIS — F17.200: ICD-10-CM

## 2019-09-30 PROCEDURE — 99282 EMERGENCY DEPT VISIT SF MDM: CPT

## 2019-09-30 NOTE — ER DOCUMENT REPORT
ED Medical Screen (RME)





- General


Chief Complaint: Hemorrhoids


Stated Complaint: POSSIBLE ABSCESS


Time Seen by Provider: 09/30/19 11:21


TRAVEL OUTSIDE OF THE [place] IN LAST 30 DAYS: No





- HPI


Notes: 





09/30/19 11:23


Patient is a 23-year-old male with a history of thrombosed external hemorrhoid 

in the past who presents complaining of an external hemorrhoid that is painful 

and possibly thrombosed again.  Patient states that he has needed to cut open in

the past.  No fever.





I have treated and performed a rapid initial assessment of this patient.  A 

comprehensive ED assessment and evaluation of the patient, analysis of test 

results and completion of medical decision making process will be conducted by 

additional ED providers.





PHYSICAL EXAMINATION:





GENERAL: Well-appearing, well-nourished and in no acute distress.  A&Ox4.  

Answers questions appropriately.





- Related Data


Allergies/Adverse Reactions: 


                                        





No Known Allergies Allergy (Verified 09/30/19 11:19)


   











Past Medical History





- Social History


Chew tobacco use (# tins/day): No


Frequency of alcohol use: Rare





- Past Medical History


Cardiac Medical History: 


   Denies: Hx Atrial Fibrillation, Hx Congestive Heart Failure, Hx Coronary 

Artery Disease


Pulmonary Medical History: 


   Denies: Hx Asthma, Hx Bronchitis, Hx COPD, Hx Pneumonia


Neurological Medical History: Denies: Hx Cerebrovascular Accident, Hx Migraine, 

Hx Seizures


Endocrine Medical History: Denies: Hx Diabetes Mellitus Type 1, Hx Diabetes 

Mellitus Type 2, Hx Graves' Disease, Hx Hyperthyroidism


Renal/ Medical History: Reports: Hx Kidney Stones.  Denies: Hx Peritoneal 

Dialysis


Malignancy Medical History: Denies Hx Bone Cancer, Denies Hx Brain Cancer, 

Denies Hx Colorectal Cancer, Denies Hx Leukemia


GI Medical History: Denies: Hx Cirrhosis, Hx Crohn's Disease, Hx Diverticulitis,

Hx Gastritis


Musculoskeltal Medical History: Denies Hx Arthritis, Denies Hx Fibromyalgia, 

Denies Hx Gout, Denies Hx Multiple Sclerosis, Reports Hx Musculoskeletal Trauma


Skin Medical History: Denies Hx Cellulitis, Denies Hx Eczema, Denies Hx MRSA


Psychiatric Medical History: 


   Denies: Hx Anxiety, Hx Attention Deficit Hyperactivity Disorder, Hx Bipolar 

Disorder


Traumatic Medical History: Reports: Hx Fractures - Multiple, Hx Pneumothorax


Past Surgical History: Reports: Hx Orthopedic Surgery - facial reconstruction, 

Other - Chest tube





- Immunizations


Immunizations up to date: Yes


Hx Diphtheria, Pertussis, Tetanus Vaccination: Yes





Physical Exam





- Vital signs


Vitals: 





                                        











Temp Pulse Resp BP Pulse Ox


 


 98.0 F   69   18   131/62 H  100 


 


 09/30/19 11:10  09/30/19 11:10  09/30/19 11:10  09/30/19 11:10  09/30/19 11:10














Course





- Vital Signs


Vital signs: 





                                        











Temp Pulse Resp BP Pulse Ox


 


 98.0 F   69   18   131/62 H  100 


 


 09/30/19 11:10  09/30/19 11:10  09/30/19 11:10  09/30/19 11:10  09/30/19 11:10

## 2019-09-30 NOTE — ER DOCUMENT REPORT
ED General





- General


Chief Complaint: Hemorrhoids


Stated Complaint: POSSIBLE ABSCESS


Time Seen by Provider: 09/30/19 11:21


Primary Care Provider: 


MEAGAN VIERA MD [Primary Care Provider] - Follow up as needed


TRAVEL OUTSIDE OF THE [place] IN LAST 30 DAYS: No





- HPI


Notes: 





Patient is a 23-year-old male presents emergency department for evaluation of 

swelling around his rectum which he believes secondary to be a thrombosed 

hemorrhoid.  He states he has had no real bleeding.  Is still swollen and 

painful.  He was concerned it could be an abscess.  He has a history of 

thrombosed hemorrhoid in the past which required hemorrhoidectomy.





- Related Data


Allergies/Adverse Reactions: 


                                        





No Known Allergies Allergy (Verified 09/30/19 11:19)


   











Past Medical History





- General


Information source: Patient





- Social History


Smoking Status: Current Every Day Smoker


Chew tobacco use (# tins/day): No


Frequency of alcohol use: Rare


Family History: None


Patient has suicidal ideation: No


Patient has homicidal ideation: No





- Past Medical History


Cardiac Medical History: 


   Denies: Hx Atrial Fibrillation, Hx Congestive Heart Failure, Hx Coronary 

Artery Disease


Pulmonary Medical History: 


   Denies: Hx Asthma, Hx Bronchitis, Hx COPD, Hx Pneumonia


Neurological Medical History: Denies: Hx Cerebrovascular Accident, Hx Migraine, 

Hx Seizures


Endocrine Medical History: Denies: Hx Diabetes Mellitus Type 1, Hx Diabetes 

Mellitus Type 2, Hx Graves' Disease, Hx Hyperthyroidism


Renal/ Medical History: Reports: Hx Kidney Stones.  Denies: Hx Peritoneal 

Dialysis


Malignancy Medical History: Denies Hx Bone Cancer, Denies Hx Brain Cancer, 

Denies Hx Colorectal Cancer, Denies Hx Leukemia


GI Medical History: Denies: Hx Cirrhosis, Hx Crohn's Disease, Hx Diverticulitis,

Hx Gastritis


Musculoskeletal Medical History: Denies Hx Arthritis, Denies Hx Fibromyalgia, 

Denies Hx Gout, Denies Hx Multiple Sclerosis, Reports Hx Musculoskeletal Trauma


Skin Medical History: Denies Hx Cellulitis, Denies Hx Eczema, Denies Hx MRSA


Psychiatric Medical History: 


   Denies: Hx Anxiety, Hx Attention Deficit Hyperactivity Disorder, Hx Bipolar 

Disorder


Traumatic Medical History: Reports: Hx Fractures - Multiple, Hx Pneumothorax


Past Surgical History: Reports: Hx Orthopedic Surgery - facial reconstruction, 

Other - Chest tube





- Immunizations


Immunizations up to date: Yes


Hx Diphtheria, Pertussis, Tetanus Vaccination: Yes





Review of Systems





- Review of Systems


Constitutional: No symptoms reported


EENT: No symptoms reported


Cardiovascular: No symptoms reported


Respiratory: No symptoms reported


Gastrointestinal: No symptoms reported


Genitourinary: No symptoms reported


Musculoskeletal: No symptoms reported


Skin: See HPI


Neurological/Psychological: No symptoms reported





Physical Exam





- Vital signs


Vitals: 


                                        











Temp Pulse Resp BP Pulse Ox


 


 98.0 F   69   18   131/62 H  100 


 


 09/30/19 11:10  09/30/19 11:10  09/30/19 11:10  09/30/19 11:10  09/30/19 11:10














- Notes


Notes: 





Vital signs reviewed, please refer to chart. Head is normocephalic, atraumatic. 

Pupils equal round, reactive to light.  Neck is supple without meningismus.  

Heart is regular rate and rhythm.  Lungs are clear to auscultation bilaterally. 

Abdomen is soft, nontender, normoactive bowel sounds throughout.  Rectal exam 

reveals about a 1 cm area of erythema and firmness on the end of the rectum, 

slightly below purple, consistent with a thrombosed hemorrhoid.





Course





- Re-evaluation


Re-evalutation: 





09/30/19 16:12


Patient presents emergency department for evaluation.  His findings are 

consistent with a thrombosed hemorrhoid.  It is extremely small at this time, 

and actually more flat to the rectum then I would feel comfortable with having 

incised.  This point will give patient symptomatic treatment and close follow-

up.  He is also encouraged to perhaps follow-up with surgery for further 

evaluation.  Return to the ED with worsening or concerning symptoms of any sort.





- Vital Signs


Vital signs: 


                                        











Temp Pulse Resp BP Pulse Ox


 


 98.0 F   65   18   101/67   99 


 


 09/30/19 13:58  09/30/19 13:58  09/30/19 11:10  09/30/19 13:58  09/30/19 13:58














Discharge





- Discharge


Clinical Impression: 


 External hemorrhoid, thrombosed





Condition: Stable


Disposition: HOME, SELF-CARE


Instructions:  HC Hemorrhoid Cream ([facility]), Hemorrhoids ([facility])


Additional Instructions: 


Epsom salt baths.  Avoid constipation.  Take medication as prescribed.  Follow-

up with your primary care physician, consider follow-up with surgery for further

treatment and evaluation.  Return to the ED with worsening or new concerning 

symptoms.


Prescriptions: 


Hydrocortisone Acetate [Anusol-Hc] 21 gm RC BID #1 oint..gm.


Referrals: 


MEAGAN VIERA MD [Primary Care Provider] - Follow up as needed

## 2019-12-18 ENCOUNTER — HOSPITAL ENCOUNTER (EMERGENCY)
Dept: HOSPITAL 62 - ER | Age: 24
Discharge: HOME | End: 2019-12-18
Payer: MEDICAID

## 2019-12-18 VITALS — SYSTOLIC BLOOD PRESSURE: 111 MMHG | DIASTOLIC BLOOD PRESSURE: 69 MMHG
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
## 2019-12-18 DIAGNOSIS — S01.511A: Primary | ICD-10-CM

## 2019-12-18 DIAGNOSIS — F17.200: ICD-10-CM
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
## 2019-12-18 PROCEDURE — 12052 INTMD RPR FACE/MM 2.6-5.0 CM: CPT

## 2019-12-18 PROCEDURE — 99284 EMERGENCY DEPT VISIT MOD MDM: CPT
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
## 2019-12-23 ENCOUNTER — HOSPITAL ENCOUNTER (EMERGENCY)
Dept: HOSPITAL 62 - ER | Age: 24
Discharge: HOME | End: 2019-12-23
Payer: MEDICAID

## 2019-12-23 VITALS — SYSTOLIC BLOOD PRESSURE: 119 MMHG | DIASTOLIC BLOOD PRESSURE: 71 MMHG

## 2019-12-23 DIAGNOSIS — F17.200: ICD-10-CM

## 2019-12-23 DIAGNOSIS — S01.511D: Primary | ICD-10-CM
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
## 2019-12-23 PROCEDURE — 99281 EMR DPT VST MAYX REQ PHY/QHP: CPT
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
## 2020-06-30 ENCOUNTER — HOSPITAL ENCOUNTER (OUTPATIENT)
Dept: HOSPITAL 62 - OD | Age: 25
End: 2020-06-30
Attending: NURSE PRACTITIONER
Payer: MEDICAID

## 2020-06-30 DIAGNOSIS — M25.311: Primary | ICD-10-CM

## 2020-06-30 NOTE — RADIOLOGY REPORT (SQ)
EXAM DESCRIPTION:  SHOULDER RIGHT 2 OR MORE VIEWS



IMAGES COMPLETED DATE/TIME:  6/30/2020 4:32 pm



REASON FOR STUDY:  INSTABILITY OF RT SHOULDER JOINT M25.311  OTHER INSTABILITY, RIGHT SHOULDER



COMPARISON:  None.



NUMBER OF VIEWS:  Three views.



TECHNIQUE:  Internal rotation, external rotation, and Y view images acquired of the right shoulder.



LIMITATIONS:  None.



FINDINGS:  MINERALIZATION: Normal.

BONES: No acute fracture.  No worrisome bone lesions.

JOINTS: No dislocation.

VISUALIZED LUNGS AND RIBS: No pneumothorax.  No rib fracture.

SOFT TISSUES: No radiopaque foreign body.

OTHER: No other significant finding.



IMPRESSION:  NEGATIVE STUDY OF THE RIGHT SHOULDER. NO RADIOGRAPHIC EVIDENCE OF ACUTE INJURY.



TECHNICAL DOCUMENTATION:  JOB ID:  [id number]

 2011 [employer]- All Rights Reserved



Reading location - IP/workstation name: [email or web address]